# Patient Record
Sex: MALE | Race: WHITE | NOT HISPANIC OR LATINO | Employment: FULL TIME | URBAN - METROPOLITAN AREA
[De-identification: names, ages, dates, MRNs, and addresses within clinical notes are randomized per-mention and may not be internally consistent; named-entity substitution may affect disease eponyms.]

---

## 2023-09-11 ENCOUNTER — OFFICE VISIT (OUTPATIENT)
Dept: FAMILY MEDICINE CLINIC | Facility: CLINIC | Age: 53
End: 2023-09-11
Payer: COMMERCIAL

## 2023-09-11 VITALS
WEIGHT: 186.6 LBS | DIASTOLIC BLOOD PRESSURE: 86 MMHG | OXYGEN SATURATION: 100 % | RESPIRATION RATE: 16 BRPM | BODY MASS INDEX: 28.28 KG/M2 | HEART RATE: 55 BPM | SYSTOLIC BLOOD PRESSURE: 114 MMHG | TEMPERATURE: 97.7 F | HEIGHT: 68 IN

## 2023-09-11 DIAGNOSIS — E55.9 VITAMIN D INSUFFICIENCY: ICD-10-CM

## 2023-09-11 DIAGNOSIS — R53.82 CHRONIC FATIGUE: ICD-10-CM

## 2023-09-11 DIAGNOSIS — Z13.1 SCREENING FOR DIABETES MELLITUS: ICD-10-CM

## 2023-09-11 DIAGNOSIS — Z13.220 ENCOUNTER FOR LIPID SCREENING FOR CARDIOVASCULAR DISEASE: ICD-10-CM

## 2023-09-11 DIAGNOSIS — Z12.5 SCREENING FOR MALIGNANT NEOPLASM OF PROSTATE: ICD-10-CM

## 2023-09-11 DIAGNOSIS — Z11.59 NEED FOR HEPATITIS C SCREENING TEST: ICD-10-CM

## 2023-09-11 DIAGNOSIS — Z13.6 ENCOUNTER FOR LIPID SCREENING FOR CARDIOVASCULAR DISEASE: ICD-10-CM

## 2023-09-11 DIAGNOSIS — Z00.00 ENCOUNTER FOR WELL ADULT EXAM WITHOUT ABNORMAL FINDINGS: Primary | ICD-10-CM

## 2023-09-11 DIAGNOSIS — Z12.11 SCREENING FOR MALIGNANT NEOPLASM OF COLON: ICD-10-CM

## 2023-09-11 PROCEDURE — 99386 PREV VISIT NEW AGE 40-64: CPT | Performed by: PHYSICIAN ASSISTANT

## 2023-09-11 RX ORDER — CETIRIZINE HYDROCHLORIDE 10 MG/1
TABLET ORAL DAILY
COMMUNITY

## 2023-09-11 NOTE — PROGRESS NOTES
"Mike Spicer is a 53 y.o. male.     History of Present Illness   Pt presents to [facility] care and for annual exam. Moved here from [place]     Stays active with golfing, swimming, and lifting weights     R shoulder issues. Had surgery last November. Pain with certain movements    Has had two knee surgeries (L for ACL and meniscus)     Not UTD on vision exam   UTD on dental exam     Never had colon cancer screening. Open to cologuard     Open to blood work. Not fasting at time of appointment       The following portions of the patient's history were reviewed and updated as appropriate: allergies, current medications, past family history, past medical history, past social history, past surgical history, and problem list.        Objective   Blood pressure 114/86, pulse 55, temperature 97.7 °F (36.5 °C), resp. rate 16, height 172.7 cm (68\"), weight 84.6 kg (186 lb 9.6 oz), SpO2 100 %.   Body mass index is 28.37 kg/m².     Physical Exam  Vitals and nursing note reviewed.   Constitutional:       Appearance: Normal appearance.   HENT:      Right Ear: Tympanic membrane, ear canal and external ear normal.      Left Ear: Tympanic membrane, ear canal and external ear normal.      Nose: Nose normal.      Mouth/Throat:      Pharynx: No oropharyngeal exudate or posterior oropharyngeal erythema.   Eyes:      Conjunctiva/sclera: Conjunctivae normal.   Cardiovascular:      Rate and Rhythm: Normal rate and regular rhythm.   Pulmonary:      Effort: Pulmonary effort is normal.      Breath sounds: Normal breath sounds.   Abdominal:      Tenderness: There is no abdominal tenderness. There is no guarding.   Skin:     General: Skin is warm and dry.   Neurological:      Mental Status: He is alert and oriented to person, place, and time.   Psychiatric:         Mood and Affect: Mood normal.         Behavior: Behavior normal.       Assessment & Plan   Diagnoses and all orders for this visit:    1. Encounter for well adult " exam without abnormal findings (Primary)  -     CBC w AUTO Differential  -     Comprehensive metabolic panel  -     Lipid Panel  -     PSA SCREENING  -     Vitamin D 25 hydroxy  -     Hepatitis C antibody  -     Hemoglobin A1c  -     TSH  -     T4, free  -     Testosterone    2. Encounter for lipid screening for cardiovascular disease  -     Lipid Panel    3. Screening for malignant neoplasm of prostate  -     PSA SCREENING    4. Need for hepatitis C screening test  -     Hepatitis C antibody    5. Vitamin D insufficiency  -     Vitamin D 25 hydroxy    6. Screening for malignant neoplasm of colon  -     Ambulatory Referral For Screening Colonoscopy    7. Screening for diabetes mellitus  -     Hemoglobin A1c    8. Chronic fatigue  -     TSH  -     T4, free  -     Testosterone      Screening labs as outlined in plan when fasting   Colonoscopy ordered     Counseling was given to patient for the following topics: instructions for management, risk factor reductions, patient and family education, and impressions . Total time of the encounter was 15 minutes and 7.5 minutes was spent counseling.

## 2023-09-23 LAB
25(OH)D3+25(OH)D2 SERPL-MCNC: 27.9 NG/ML (ref 30–100)
ALBUMIN SERPL-MCNC: 4.8 G/DL (ref 3.5–5.2)
ALBUMIN/GLOB SERPL: 2.1 G/DL
ALP SERPL-CCNC: 67 U/L (ref 39–117)
ALT SERPL-CCNC: 23 U/L (ref 1–41)
AST SERPL-CCNC: 21 U/L (ref 1–40)
BASOPHILS # BLD AUTO: 0.04 10*3/MM3 (ref 0–0.2)
BASOPHILS NFR BLD AUTO: 0.8 % (ref 0–1.5)
BILIRUB SERPL-MCNC: 0.5 MG/DL (ref 0–1.2)
BUN SERPL-MCNC: 14 MG/DL (ref 6–20)
BUN/CREAT SERPL: 13.6 (ref 7–25)
CALCIUM SERPL-MCNC: 9.5 MG/DL (ref 8.6–10.5)
CHLORIDE SERPL-SCNC: 104 MMOL/L (ref 98–107)
CHOLEST SERPL-MCNC: 245 MG/DL (ref 0–200)
CO2 SERPL-SCNC: 28.7 MMOL/L (ref 22–29)
CREAT SERPL-MCNC: 1.03 MG/DL (ref 0.76–1.27)
EGFRCR SERPLBLD CKD-EPI 2021: 87.9 ML/MIN/1.73
EOSINOPHIL # BLD AUTO: 0.11 10*3/MM3 (ref 0–0.4)
EOSINOPHIL NFR BLD AUTO: 2.1 % (ref 0.3–6.2)
ERYTHROCYTE [DISTWIDTH] IN BLOOD BY AUTOMATED COUNT: 13.1 % (ref 12.3–15.4)
GLOBULIN SER CALC-MCNC: 2.3 GM/DL
GLUCOSE SERPL-MCNC: 102 MG/DL (ref 65–99)
HBA1C MFR BLD: 5.9 % (ref 4.8–5.6)
HCT VFR BLD AUTO: 49.9 % (ref 37.5–51)
HCV IGG SERPL QL IA: NON REACTIVE
HDLC SERPL-MCNC: 49 MG/DL (ref 40–60)
HGB BLD-MCNC: 16.8 G/DL (ref 13–17.7)
IMM GRANULOCYTES # BLD AUTO: 0 10*3/MM3 (ref 0–0.05)
IMM GRANULOCYTES NFR BLD AUTO: 0 % (ref 0–0.5)
LDLC SERPL CALC-MCNC: 168 MG/DL (ref 0–100)
LYMPHOCYTES # BLD AUTO: 1.86 10*3/MM3 (ref 0.7–3.1)
LYMPHOCYTES NFR BLD AUTO: 35.8 % (ref 19.6–45.3)
MCH RBC QN AUTO: 30 PG (ref 26.6–33)
MCHC RBC AUTO-ENTMCNC: 33.7 G/DL (ref 31.5–35.7)
MCV RBC AUTO: 89.1 FL (ref 79–97)
MONOCYTES # BLD AUTO: 0.45 10*3/MM3 (ref 0.1–0.9)
MONOCYTES NFR BLD AUTO: 8.7 % (ref 5–12)
NEUTROPHILS # BLD AUTO: 2.74 10*3/MM3 (ref 1.7–7)
NEUTROPHILS NFR BLD AUTO: 52.6 % (ref 42.7–76)
NRBC BLD AUTO-RTO: 0 /100 WBC (ref 0–0.2)
PLATELET # BLD AUTO: 257 10*3/MM3 (ref 140–450)
POTASSIUM SERPL-SCNC: 4.7 MMOL/L (ref 3.5–5.2)
PROT SERPL-MCNC: 7.1 G/DL (ref 6–8.5)
PSA SERPL-MCNC: 6.16 NG/ML (ref 0–4)
RBC # BLD AUTO: 5.6 10*6/MM3 (ref 4.14–5.8)
SODIUM SERPL-SCNC: 141 MMOL/L (ref 136–145)
T4 FREE SERPL-MCNC: 1.08 NG/DL (ref 0.93–1.7)
TESTOST SERPL-MCNC: 476 NG/DL (ref 264–916)
TRIGL SERPL-MCNC: 152 MG/DL (ref 0–150)
TSH SERPL DL<=0.005 MIU/L-ACNC: 1.81 UIU/ML (ref 0.27–4.2)
VLDLC SERPL CALC-MCNC: 28 MG/DL (ref 5–40)
WBC # BLD AUTO: 5.2 10*3/MM3 (ref 3.4–10.8)

## 2023-09-25 DIAGNOSIS — R97.20 ELEVATED PSA: Primary | ICD-10-CM

## 2023-10-16 RX ORDER — SODIUM, POTASSIUM,MAG SULFATES 17.5-3.13G
2 SOLUTION, RECONSTITUTED, ORAL ORAL TAKE AS DIRECTED
Qty: 354 ML | Refills: 0 | Status: SHIPPED | OUTPATIENT
Start: 2023-10-16

## 2023-10-17 ENCOUNTER — OFFICE VISIT (OUTPATIENT)
Dept: UROLOGY | Facility: CLINIC | Age: 53
End: 2023-10-17
Payer: COMMERCIAL

## 2023-10-17 VITALS — HEART RATE: 66 BPM | BODY MASS INDEX: 28.19 KG/M2 | OXYGEN SATURATION: 98 % | WEIGHT: 186 LBS | HEIGHT: 68 IN

## 2023-10-17 DIAGNOSIS — R97.20 ELEVATED PROSTATE SPECIFIC ANTIGEN (PSA): Primary | ICD-10-CM

## 2023-10-17 NOTE — PROGRESS NOTES
Elevated PSA Office Visit      Patient Name: Bridger Spicer  : 1970   MRN: [id number]     Chief Complaint:  Elevated PSA.    Chief Complaint   Patient presents with    Elevated PSA       Referring Provider: Kam Almanza PA    History of Present Illness: Bridger Spicer is a 53 y.o. male who presents today with history of elevated PSA.  Patient presents today for evaluation after identification of elevated PSA value of 6.1 in 2023 at the time of annual screening exam with primary care provider.  No prior PSA value available for review at this time.  Patient reports very minimal baseline lower urinary tract symptoms-IPSS 2.  Denies dysuria, hematuria, history of urinary tract infection.    Subjective      Review of System: Review of Systems   Genitourinary:  Negative for decreased urine volume, difficulty urinating, dysuria, enuresis, flank pain, frequency, hematuria and urgency.      I have reviewed the ROS documented by my clinical staff, updated as appropriate and I agree. Bernard Almanzar MD    Past Medical History: History reviewed. No pertinent past medical history.    Past Surgical History:   Past Surgical History:   Procedure Laterality Date    APPENDECTOMY      INGUINAL HERNIA REPAIR      2 sx's    KNEE MENISCECTOMY      KNEE SURGERY      MANDIBLE SURGERY         Family History:   Family History   Problem Relation Age of Onset    Cancer Father        Social History:   Social History     Socioeconomic History    Marital status:    Tobacco Use    Smoking status: Never     Passive exposure: Never    Smokeless tobacco: Never   Vaping Use    Vaping Use: Never used   Substance and Sexual Activity    Alcohol use: Never    Drug use: Never    Sexual activity: Defer       Medications:     Current Outpatient Medications:     cetirizine (zyrTEC) 10 MG tablet, Take  by mouth Daily. (Patient not taking: Reported on 10/17/2023), Disp: , Rfl:     sodium-potassium-magnesium sulfates (Suprep Bowel Prep  "Kit) 17.5-3.13-1.6 GM/177ML solution oral solution, Take 2 bottles by mouth Take As Directed. (Patient not taking: Reported on 10/17/2023), Disp: 354 mL, Rfl: 0    Allergies:   Allergies   Allergen Reactions    Codeine Nausea And Vomiting       IPSS Questionnaire (AUA-7):  Over the past month…    1)  Incomplete Emptying  How often have you had a sensation of not emptying your bladder?  0 - Not at all   2)  Frequency  How often have you had to urinate less than every two hours? 1 - Less than 1 time in 5   3)  Intermittency  How often have you found you stopped and started again several times when you urinated?  0 - Not at all   4) Urgency  How often have you found it difficult to postpone urination?  0 - Not at all   5) Weak Stream  How often have you had a weak urinary stream?  0 - Not at all   6) Straining  How often have you had to push or strain to begin urination?  0 - Not at all   7) Nocturia  How many times did you typically get up at night to urinate?  1 - 1 time   Total Score:  2       Quality of life due to urinary symptoms:  If you were to spend the rest of your life with your urinary condition the way it is now, how would you feel about that? 0-Delighted   Urine Leakage (Incontinence) 0-No Leakage         Objective     Physical Exam:   Vital Signs:   Vitals:    10/17/23 0856   Pulse: 66   SpO2: 98%   Weight: 84.4 kg (186 lb)   Height: 172.7 cm (67.99\")   PainSc: 0-No pain     Body mass index is 28.29 kg/m².     Physical Exam         Labs:   Hematocrit (%)   Date Value   09/22/2023 49.9       Lab Results   Component Value Date    PSA 6.160 (H) 09/22/2023       Images:   No Images in the past 120 days found..    Measures:   Tobacco:   Bridger Spcier  reports that he has never smoked. He has never been exposed to tobacco smoke. He has never used smokeless tobacco.. I have educated him on the risk of diseases from using tobacco products.     Assessment / Plan      Assessment:     Mr. Spicer is a 53 y.o. male " with elevated PSA.  Patient has been identified to have elevated PSA of 6.1 at the time of annual screening exam with primary care provider in 9/2023.  No available prior PSA for comparison.  Very minimal baseline lower urinary tract symptoms-IPSS 2    Diagnoses and all orders for this visit:    1. Elevated prostate specific antigen (PSA) (Primary)  -     PSA Total+% Free (Serial); Future         Patient Education:   Today I discussed with the patient the etiology and management of elevated PSA.  Discussed that PSA is a protein used as a marker for prostate cancer screening. We discussed in depth the role for prostate cancer screening.   We discussed that over 90% of prostate cancers are detected by screening.  We discussed that screening means a test performed on an asymptomatic patient to detect cancer at an earlier point in time.  We discussed the role of screening which includes both PSA and ARRON.  We discussed the harms of prostate cancer screening including potential overdiagnosis and overtreatment.  Discussed the benefits of screening including diagnosis of cancer or lower staging grade.  Discussed that prostate imaging is not recommended for prostate cancer screening.  Discussed that screening should be offered to him in of an appropriate age to have a life expectancy more than 10 years.  Discussed that PSA is not capable of diagnosing prostate cancer.  We discussed that a biopsy is indicated if PSA is elevated as a confirmation of cancer.  Discussed the decision to perform a biopsy is often based on many criteria not just a total PSA value.  Discussed that a single abnormal PSA should not prompt biopsy.  Abnormal PSA level should be verified after a few weeks.  We discussed the possible etiologies that can result in an elevated PSA test other test other than cancer.   I evaluated his PSA which was elevated at 6.1.  He does not have another comparison value.  I discussed that at this time I would like to  repeat his PSA to ensure its exact value.  If his PSA remains elevated we will further discuss adjunct testing with 4K score, MDX, multiparametric MRI, possible prostate biopsy    Follow Up:   Return in about 3 months (around 1/17/2024) for Recheck, Follow up after Labs.    I spent approximately 45 minutes providing clinical care for this patient; including review of patient's chart and provider documentation, face to face time spent with patient in examination room (obtaining history, performing physical exam, discussing diagnosis and management options), placing orders, and completing patient documentation.     Bernard Almanzar MD  [facility] [place]

## 2023-10-27 ENCOUNTER — OUTSIDE FACILITY SERVICE (OUTPATIENT)
Dept: GASTROENTEROLOGY | Facility: CLINIC | Age: 53
End: 2023-10-27
Payer: COMMERCIAL

## 2023-10-27 PROCEDURE — 45380 COLONOSCOPY AND BIOPSY: CPT | Performed by: INTERNAL MEDICINE

## 2023-11-10 ENCOUNTER — TELEPHONE (OUTPATIENT)
Dept: GASTROENTEROLOGY | Facility: CLINIC | Age: 53
End: 2023-11-10
Payer: COMMERCIAL

## 2023-11-10 NOTE — TELEPHONE ENCOUNTER
----- Message from Florecita Ayala MD sent at 11/10/2023  2:32 PM EST -----  Regarding: Pathology  Please let patient know that his pathology from his colonoscopy was benign.  He should repeat his colonoscopy in 7 years

## 2024-01-22 ENCOUNTER — LAB (OUTPATIENT)
Dept: LAB | Facility: HOSPITAL | Age: 54
End: 2024-01-22
Payer: COMMERCIAL

## 2024-01-22 DIAGNOSIS — R97.20 ELEVATED PROSTATE SPECIFIC ANTIGEN (PSA): ICD-10-CM

## 2024-01-22 PROCEDURE — 84153 ASSAY OF PSA TOTAL: CPT

## 2024-01-22 PROCEDURE — 36415 COLL VENOUS BLD VENIPUNCTURE: CPT

## 2024-01-22 PROCEDURE — 84154 ASSAY OF PSA FREE: CPT

## 2024-01-23 LAB
PSA FREE MFR SERPL: 14.2 %
PSA FREE SERPL-MCNC: 0.75 NG/ML
PSA SERPL-MCNC: 5.3 NG/ML (ref 0–4)

## 2024-01-31 ENCOUNTER — OFFICE VISIT (OUTPATIENT)
Dept: UROLOGY | Facility: CLINIC | Age: 54
End: 2024-01-31
Payer: COMMERCIAL

## 2024-01-31 VITALS — HEART RATE: 64 BPM | OXYGEN SATURATION: 98 % | BODY MASS INDEX: 28.19 KG/M2 | WEIGHT: 186 LBS | HEIGHT: 68 IN

## 2024-01-31 DIAGNOSIS — R97.20 ELEVATED PROSTATE SPECIFIC ANTIGEN (PSA): Primary | ICD-10-CM

## 2024-01-31 NOTE — PROGRESS NOTES
Elevated PSA Office Visit      Patient Name: Bridger Spicer  : 1970   MRN: [id number]     Chief Complaint:  Elevated PSA.    Chief Complaint   Patient presents with    Elevated PSA       History of Present Illness: Bridger Spicer is a 53 y.o. male who presents today with history of elevated PSA.  The patient was seen in 10/2023 after elevated PSA value of 6.1 was identified at the time of annual exam.  He returns today for 6-month follow-up with repeat PSA.  PSA has remained elevated at 5.3.  He continues to deny bothersome lower urinary tract symptoms.  Denies dysuria or hematuria.    Subjective      Review of System: Review of Systems   Genitourinary:  Negative for decreased urine volume, difficulty urinating, dysuria, enuresis, flank pain, frequency, hematuria and urgency.      I have reviewed the ROS documented by my clinical staff, updated as appropriate and I agree. Bernard Almanzar MD    Past Medical History: History reviewed. No pertinent past medical history.    Past Surgical History:   Past Surgical History:   Procedure Laterality Date    APPENDECTOMY      INGUINAL HERNIA REPAIR      2 sx's    KNEE MENISCECTOMY      KNEE SURGERY      MANDIBLE SURGERY         Family History:   Family History   Problem Relation Age of Onset    Cancer Father        Social History:   Social History     Socioeconomic History    Marital status:    Tobacco Use    Smoking status: Never     Passive exposure: Never    Smokeless tobacco: Never   Vaping Use    Vaping Use: Never used   Substance and Sexual Activity    Alcohol use: Never    Drug use: Never    Sexual activity: Defer       Medications:     Current Outpatient Medications:     cetirizine (zyrTEC) 10 MG tablet, Take  by mouth Daily. (Patient not taking: Reported on 10/17/2023), Disp: , Rfl:     sodium-potassium-magnesium sulfates (Suprep Bowel Prep Kit) 17.5-3.13-1.6 GM/177ML solution oral solution, Take 2 bottles by mouth Take As Directed.  "(Patient not taking: Reported on 10/17/2023), Disp: 354 mL, Rfl: 0    Allergies:   Allergies   Allergen Reactions    Codeine Nausea And Vomiting       IPSS Questionnaire (AUA-7):  Over the past month…    1)  Incomplete Emptying  How often have you had a sensation of not emptying your bladder?  0 - Not at all   2)  Frequency  How often have you had to urinate less than every two hours? 1 - Less than 1 time in 5   3)  Intermittency  How often have you found you stopped and started again several times when you urinated?  0 - Not at all   4) Urgency  How often have you found it difficult to postpone urination?  0 - Not at all   5) Weak Stream  How often have you had a weak urinary stream?  0 - Not at all   6) Straining  How often have you had to push or strain to begin urination?  0 - Not at all   7) Nocturia  How many times did you typically get up at night to urinate?  1 - 1 time   Total Score:  2       Quality of life due to urinary symptoms:  If you were to spend the rest of your life with your urinary condition the way it is now, how would you feel about that? 0-Delighted   Urine Leakage (Incontinence) 0-No Leakage       Objective     Physical Exam:   Vital Signs:   Vitals:    01/31/24 0901   Pulse: 64   SpO2: 98%   Weight: 84.4 kg (186 lb)   Height: 172.7 cm (67.99\")   PainSc: 0-No pain     Body mass index is 28.29 kg/m².     Physical Exam  Vitals and nursing note reviewed.   Constitutional:       Appearance: Normal appearance.   HENT:      Head: Normocephalic and atraumatic.   Cardiovascular:      Comments: Well perfused  Pulmonary:      Effort: Pulmonary effort is normal.   Abdominal:      General: Abdomen is flat.      Palpations: Abdomen is soft.   Musculoskeletal:         General: Normal range of motion.   Skin:     General: Skin is warm and dry.   Neurological:      General: No focal deficit present.      Mental Status: He is alert and oriented to person, place, and time. Mental status is at baseline. "   Psychiatric:         Mood and Affect: Mood normal.         Behavior: Behavior normal.         Thought Content: Thought content normal.         Judgment: Judgment normal.           Labs:   Hematocrit (%)   Date Value   09/22/2023 49.9       Lab Results   Component Value Date    PSA 5.3 (H) 01/22/2024    PSA 6.160 (H) 09/22/2023       Images:   No Images in the past 120 days found..    Measures:   Tobacco:   Bridger Spicer  reports that he has never smoked. He has never been exposed to tobacco smoke. He has never used smokeless tobacco.. I have educated him on the risk of diseases from using tobacco products.     Assessment / Plan      Assessment:     Mr. Spicer is a 53 y.o. male with elevated PSA.  Most recent PSA in 1/2024 has returned 5.3, prior value 6.1 in 9/2023.  Very minimal baseline lower urinary tract symptoms.    Diagnoses and all orders for this visit:    1. Elevated prostate specific antigen (PSA) (Primary)  -     MRI Prostate With & Without Contrast; Future  -     Basic Metabolic Panel; Future             Patient Education:   Today I discussed with the patient the etiology and management of elevated PSA.  Discussed that PSA is a protein used as a marker for prostate cancer screening. We discussed in depth the role for prostate cancer screening.   We discussed that over 90% of prostate cancers are detected by screening.  We discussed that screening means a test performed on an asymptomatic patient to detect cancer at an earlier point in time.  We discussed the role of screening which includes both PSA and ARRON.  We discussed the harms of prostate cancer screening including potential overdiagnosis and overtreatment.  Discussed the benefits of screening including diagnosis of cancer or lower staging grade.  Discussed that prostate imaging is not recommended for prostate cancer screening.  Discussed that screening should be offered to him in of an appropriate age to have a life expectancy more than  10 years.  Discussed that PSA is not capable of diagnosing prostate cancer.  We discussed that a biopsy is indicated if PSA is elevated as a confirmation of cancer.  Discussed the decision to perform a biopsy is often based on many criteria not just a total PSA value.  Discussed that a single abnormal PSA should not prompt biopsy.  Abnormal PSA level should be verified after a few weeks.  We discussed the possible etiologies that can result in an elevated PSA test other test other than cancer.   I evaluated his PSA which was elevated at 5.3, prior value 6.1.  Given the patient's age, overall medical status we have recommended further workup.  We have recommended multiparametric MRI.  We have discussed the risks and benefits, indications for this imaging study.  He will follow-up after completion of imaging study for further discussion of management, possible prostate biopsy..     Follow Up:   Return in about 3 weeks (around 2/21/2024).    I spent approximately 30 minutes providing clinical care for this patient; including review of patient's chart and provider documentation, face to face time spent with patient in examination room (obtaining history, performing physical exam, discussing diagnosis and management options), placing orders, and completing patient documentation.     Bernard Almanzar MD  [facility] [place]

## 2024-02-01 PROBLEM — R97.20 ELEVATED PROSTATE SPECIFIC ANTIGEN (PSA): Status: ACTIVE | Noted: 2024-02-01

## 2024-02-06 ENCOUNTER — LAB (OUTPATIENT)
Dept: LAB | Facility: HOSPITAL | Age: 54
End: 2024-02-06
Payer: COMMERCIAL

## 2024-02-06 DIAGNOSIS — R97.20 ELEVATED PROSTATE SPECIFIC ANTIGEN (PSA): ICD-10-CM

## 2024-02-06 PROCEDURE — 36415 COLL VENOUS BLD VENIPUNCTURE: CPT

## 2024-02-06 PROCEDURE — 80048 BASIC METABOLIC PNL TOTAL CA: CPT

## 2024-02-07 LAB
ANION GAP SERPL CALCULATED.3IONS-SCNC: 6.8 MMOL/L (ref 5–15)
BUN SERPL-MCNC: 16 MG/DL (ref 6–20)
BUN/CREAT SERPL: 13.6 (ref 7–25)
CALCIUM SPEC-SCNC: 9.3 MG/DL (ref 8.6–10.5)
CHLORIDE SERPL-SCNC: 103 MMOL/L (ref 98–107)
CO2 SERPL-SCNC: 29.2 MMOL/L (ref 22–29)
CREAT SERPL-MCNC: 1.18 MG/DL (ref 0.76–1.27)
EGFRCR SERPLBLD CKD-EPI 2021: 74.7 ML/MIN/1.73
GLUCOSE SERPL-MCNC: 81 MG/DL (ref 65–99)
POTASSIUM SERPL-SCNC: 5 MMOL/L (ref 3.5–5.2)
SODIUM SERPL-SCNC: 139 MMOL/L (ref 136–145)

## 2024-02-29 ENCOUNTER — OFFICE VISIT (OUTPATIENT)
Dept: UROLOGY | Facility: CLINIC | Age: 54
End: 2024-02-29
Payer: COMMERCIAL

## 2024-02-29 VITALS — WEIGHT: 185 LBS | HEART RATE: 63 BPM | BODY MASS INDEX: 28.04 KG/M2 | HEIGHT: 68 IN | OXYGEN SATURATION: 98 %

## 2024-02-29 DIAGNOSIS — R97.20 ELEVATED PROSTATE SPECIFIC ANTIGEN (PSA): Primary | ICD-10-CM

## 2024-02-29 NOTE — PROGRESS NOTES
Elevated PSA Office Visit      Patient Name: Bridger Spicer  : 1970   MRN: [id number]     Chief Complaint:  Elevated PSA.    Chief Complaint   Patient presents with    Elevated PSA         History of Present Illness: Bridger Spicer is a 53 y.o. male who presents today with history of elevated PSA.  PSA identified to be elevated at 6.1 in 2023, 5.3 and 2024.  He presents today after obtaining multiparametric MRI for further evaluation.  He reports stable, minimal baseline lower urinary tract symptoms today.    Subjective      Review of System: Review of Systems   Genitourinary:  Negative for decreased urine volume, difficulty urinating, dysuria, enuresis, flank pain, frequency, hematuria and urgency.      I have reviewed the ROS documented by my clinical staff, updated as appropriate and I agree. Bernard Almanzar MD    Past Medical History: History reviewed. No pertinent past medical history.    Past Surgical History:   Past Surgical History:   Procedure Laterality Date    APPENDECTOMY      INGUINAL HERNIA REPAIR      2 sx's    KNEE MENISCECTOMY      KNEE SURGERY      MANDIBLE SURGERY         Family History:   Family History   Problem Relation Age of Onset    Cancer Father        Social History:   Social History     Socioeconomic History    Marital status:    Tobacco Use    Smoking status: Never     Passive exposure: Never    Smokeless tobacco: Never   Vaping Use    Vaping status: Never Used   Substance and Sexual Activity    Alcohol use: Never    Drug use: Never    Sexual activity: Defer       Medications:     Current Outpatient Medications:     cetirizine (zyrTEC) 10 MG tablet, Take  by mouth Daily. (Patient not taking: Reported on 10/17/2023), Disp: , Rfl:     sodium-potassium-magnesium sulfates (Suprep Bowel Prep Kit) 17.5-3.13-1.6 GM/177ML solution oral solution, Take 2 bottles by mouth Take As Directed. (Patient not taking: Reported on 10/17/2023), Disp: 354 mL, Rfl:  "0    Allergies:   Allergies   Allergen Reactions    Codeine Nausea And Vomiting       IPSS Questionnaire (AUA-7):  Over the past month…    1)  Incomplete Emptying  How often have you had a sensation of not emptying your bladder?  0 - Not at all   2)  Frequency  How often have you had to urinate less than every two hours? 0 - Not at all   3)  Intermittency  How often have you found you stopped and started again several times when you urinated?  0 - Not at all   4) Urgency  How often have you found it difficult to postpone urination?  0 - Not at all   5) Weak Stream  How often have you had a weak urinary stream?  0 - Not at all   6) Straining  How often have you had to push or strain to begin urination?  0 - Not at all   7) Nocturia  How many times did you typically get up at night to urinate?  0 - None   Total Score:  0       Quality of life due to urinary symptoms:  If you were to spend the rest of your life with your urinary condition the way it is now, how would you feel about that? 0-Delighted   Urine Leakage (Incontinence) 0-No Leakage           Objective     Physical Exam:   Vital Signs:   Vitals:    02/29/24 0841   Pulse: 63   SpO2: 98%   Weight: 83.9 kg (185 lb)   Height: 172.7 cm (67.99\")   PainSc: 0-No pain     Body mass index is 28.14 kg/m².     Physical Exam  Vitals and nursing note reviewed.   Constitutional:       Appearance: Normal appearance.   HENT:      Head: Normocephalic and atraumatic.   Cardiovascular:      Comments: Well perfused  Pulmonary:      Effort: Pulmonary effort is normal.   Abdominal:      General: Abdomen is flat.      Palpations: Abdomen is soft.   Musculoskeletal:         General: Normal range of motion.   Skin:     General: Skin is warm and dry.   Neurological:      General: No focal deficit present.      Mental Status: He is alert and oriented to person, place, and time. Mental status is at baseline.   Psychiatric:         Mood and Affect: Mood normal.         Behavior: Behavior " normal.         Thought Content: Thought content normal.         Judgment: Judgment normal.             Labs:   Hematocrit (%)   Date Value   09/22/2023 49.9       Lab Results   Component Value Date    PSA 5.3 (H) 01/22/2024    PSA 6.160 (H) 09/22/2023       Images:   MR prostate without & with IV contrast    Result Date: 2/27/2024  PI-RADS category 2 abnormality in the posterior, bilateral peripheral zone slightly asymmetric to the left. Findings favor prostatitis. Consider follow-up if indicated. Images reviewed, interpreted, and dictated by Olesya Redding MD      Measures:   Tobacco:   Bridger Spicer  reports that he has never smoked. He has never been exposed to tobacco smoke. He has never used smokeless tobacco.. I have educated him on the risk of diseases from using tobacco products.     Assessment / Plan      Assessment:     Mr. Spicer is a 53 y.o. male with elevated PSA. PSA 5.3 in 1/2024, prior value 6.1 in 9/2023.  Given elevated PSA we have discussed the indication for multiparametric MRI.  Returns today after completing MRI.  MRI has demonstrated no high-grade PI-RADS 4 or 5 lesions.  No intermediate PI-RADS 3 lesion.  He has been identified to have only single PI-RADS 2 lesion in the bilateral peripheral zone favoring prostatitis.  Today we have discussed MRI findings.  We have discussed that there are no high risk lesions identified.  At this time we would not recommend biopsy.  We have discussed continued PSA screening and surveillance.  If continuing increasing PSA trend is demonstrated we may further consider repeat MRI or prostate biopsy and he is understanding agreeable.  Follow-up in 6 months with repeat PSA.    Diagnoses and all orders for this visit:    1. Elevated prostate specific antigen (PSA) (Primary)  -     PSA Total+% Free (Serial); Future             Follow Up:   Return in about 6 months (around 8/29/2024) for Recheck.    I spent approximately 30 minutes providing clinical care  for this patient; including review of patient's chart and provider documentation, face to face time spent with patient in examination room (obtaining history, performing physical exam, discussing diagnosis and management options), placing orders, and completing patient documentation.     Bernard Almanzar MD  [facility] [place]

## 2024-10-03 ENCOUNTER — TELEPHONE (OUTPATIENT)
Dept: UROLOGY | Facility: CLINIC | Age: 54
End: 2024-10-03
Payer: COMMERCIAL

## 2024-10-03 NOTE — TELEPHONE ENCOUNTER
Pt messaged through [employer] that he needed a follow up with Dr Almanzar and I messaged asking him to call since he doesn't have any openings that work with his schedule. Hub ok to schedule follow up

## 2024-10-03 NOTE — TELEPHONE ENCOUNTER
----- Message from [employer] sent at 10/3/2024  9:25 AM EDT -----  Regarding: Appointment Request  Contact: [phone]  Ok,  Can I get something scheduled for two weeks from now

## 2025-01-22 ENCOUNTER — LAB (OUTPATIENT)
Dept: LAB | Facility: HOSPITAL | Age: 55
End: 2025-01-22
Payer: COMMERCIAL

## 2025-01-22 DIAGNOSIS — R97.20 ELEVATED PROSTATE SPECIFIC ANTIGEN (PSA): ICD-10-CM

## 2025-01-22 PROCEDURE — 36415 COLL VENOUS BLD VENIPUNCTURE: CPT

## 2025-01-22 PROCEDURE — 84153 ASSAY OF PSA TOTAL: CPT

## 2025-01-22 PROCEDURE — 84154 ASSAY OF PSA FREE: CPT

## 2025-01-24 LAB
PSA FREE MFR SERPL: 15.3 %
PSA FREE SERPL-MCNC: 0.52 NG/ML
PSA SERPL-MCNC: 3.4 NG/ML (ref 0–4)

## 2025-01-30 ENCOUNTER — OFFICE VISIT (OUTPATIENT)
Dept: UROLOGY | Facility: CLINIC | Age: 55
End: 2025-01-30
Payer: COMMERCIAL

## 2025-01-30 VITALS — WEIGHT: 165 LBS | HEIGHT: 68 IN | BODY MASS INDEX: 25.01 KG/M2

## 2025-01-30 DIAGNOSIS — R97.20 ELEVATED PSA: Primary | ICD-10-CM

## 2025-01-30 NOTE — PROGRESS NOTES
Elevated PSA Office Visit      Patient Name: Bridger Spicer  : 1970   MRN: [id number]     Chief Complaint:  Elevated PSA.    Chief Complaint   Patient presents with    Elevated PSA         History of Present Illness: Bridger Spicer is a 54 y.o. male who presents today with history of elevated PSA.  Returns today for 6-month follow-up with PSA.  Current PSA value 3.4 identified in 2025.  Prior value 5.3 and 2024.  He reports no current bothersome baseline lower urinary tract symptoms -IPSS 1.    Subjective      Review of System: Review of Systems   Genitourinary:  Negative for decreased urine volume, difficulty urinating, dysuria, enuresis, flank pain, frequency, hematuria and urgency.      I have reviewed the ROS documented by my clinical staff, updated as appropriate and I agree. Bernard Almanzar MD    Past Medical History:   Past Medical History:   Diagnosis Date    Elevated PSA        Past Surgical History:   Past Surgical History:   Procedure Laterality Date    APPENDECTOMY      HERNIA REPAIR      INGUINAL HERNIA REPAIR      2 sx's    KNEE MENISCECTOMY      KNEE SURGERY      MANDIBLE SURGERY      ORCHIECTOMY         Family History:   Family History   Problem Relation Age of Onset    Cancer Father        Social History:   Social History     Socioeconomic History    Marital status:    Tobacco Use    Smoking status: Never     Passive exposure: Never    Smokeless tobacco: Never   Vaping Use    Vaping status: Never Used   Substance and Sexual Activity    Alcohol use: Never    Drug use: Never    Sexual activity: Yes     Partners: Female       Medications:     Current Outpatient Medications:     cetirizine (zyrTEC) 10 MG tablet, Take  by mouth Daily. (Patient not taking: Reported on 2025), Disp: , Rfl:     sodium-potassium-magnesium sulfates (Suprep Bowel Prep Kit) 17.5-3.13-1.6 GM/177ML solution oral solution, Take 2 bottles by mouth Take As Directed. (Patient not taking:  "Reported on 1/30/2025), Disp: 354 mL, Rfl: 0    Allergies:   Allergies   Allergen Reactions    Codeine Nausea And Vomiting       IPSS Questionnaire (AUA-7):  Over the past month…    1)  Incomplete Emptying  How often have you had a sensation of not emptying your bladder?  0 - Not at all   2)  Frequency  How often have you had to urinate less than every two hours? 0 - Not at all   3)  Intermittency  How often have you found you stopped and started again several times when you urinated?  0 - Not at all   4) Urgency  How often have you found it difficult to postpone urination?  0 - Not at all   5) Weak Stream  How often have you had a weak urinary stream?  0 - Not at all   6) Straining  How often have you had to push or strain to begin urination?  0 - Not at all   7) Nocturia  How many times did you typically get up at night to urinate?  1 - 1 time   Total Score:  1       Quality of life due to urinary symptoms:  If you were to spend the rest of your life with your urinary condition the way it is now, how would you feel about that? 1-Pleased   Urine Leakage (Incontinence) 0-No Leakage         Objective     Physical Exam:   Vital Signs:   Vitals:    01/30/25 0956   Weight: 74.8 kg (165 lb)   Height: 172.7 cm (67.99\")     Body mass index is 25.09 kg/m².     Physical Exam  Vitals and nursing note reviewed.   Constitutional:       Appearance: Normal appearance.   HENT:      Head: Normocephalic and atraumatic.   Cardiovascular:      Comments: Well perfused  Pulmonary:      Effort: Pulmonary effort is normal.   Abdominal:      General: Abdomen is flat.      Palpations: Abdomen is soft.   Musculoskeletal:         General: Normal range of motion.   Skin:     General: Skin is warm and dry.   Neurological:      General: No focal deficit present.      Mental Status: He is alert and oriented to person, place, and time. Mental status is at baseline.   Psychiatric:         Mood and Affect: Mood normal.         Behavior: Behavior " normal.         Thought Content: Thought content normal.         Judgment: Judgment normal.             Labs:   Hematocrit (%)   Date Value   09/22/2023 49.9       Lab Results   Component Value Date    PSA 3.4 01/22/2025    PSA 5.3 (H) 01/22/2024    PSA 6.160 (H) 09/22/2023       Images:   No Images in the past 120 days found..    Measures:   Tobacco:   Bridger Spicer  reports that he has never smoked. He has never been exposed to tobacco smoke. He has never used smokeless tobacco. I have educated him on the risk of diseases from using tobacco product.     Assessment / Plan      Assessment:     Mr. Spicer is a 54 y.o. male with elevated PSA.  Current PSA value 3.4.  Prior PSA value 5.3.  Discussed indication for continued PSA screening and trend.  He will follow-up in 1 year for next PSA check.    Diagnoses and all orders for this visit:    1. Elevated PSA (Primary)  -     PSA Total+% Free (Serial); Future           Follow Up:   Return in about 1 year (around 1/30/2026) for Recheck.    I spent approximately 20 minutes providing clinical care for this patient; including review of patient's chart and provider documentation, face to face time spent with patient in examination room (obtaining history, performing physical exam, discussing diagnosis and management options), placing orders, and completing patient documentation.     Bernard Almanzar MD  [facility] [place]